# Patient Record
Sex: FEMALE | Race: WHITE
[De-identification: names, ages, dates, MRNs, and addresses within clinical notes are randomized per-mention and may not be internally consistent; named-entity substitution may affect disease eponyms.]

---

## 2019-02-09 ENCOUNTER — HOSPITAL ENCOUNTER (EMERGENCY)
Dept: HOSPITAL 54 - ER | Age: 54
LOS: 1 days | Discharge: HOME | End: 2019-02-10
Payer: COMMERCIAL

## 2019-02-09 VITALS — WEIGHT: 146 LBS | BODY MASS INDEX: 26.87 KG/M2 | HEIGHT: 62 IN

## 2019-02-09 DIAGNOSIS — S80.12XA: ICD-10-CM

## 2019-02-09 DIAGNOSIS — G93.89: ICD-10-CM
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
## 2019-02-09 DIAGNOSIS — S52.591A: Primary | ICD-10-CM
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
## 2019-02-10 VITALS — SYSTOLIC BLOOD PRESSURE: 127 MMHG | DIASTOLIC BLOOD PRESSURE: 86 MMHG

## 2019-02-10 NOTE — NUR
Patient discharged to home in stable condition. Written and verbal after care 
instructions given. Patient verbalizes understanding of instruction. Pt 
ambulatory with a steady gait